# Patient Record
Sex: MALE | Race: WHITE | Employment: FULL TIME | URBAN - METROPOLITAN AREA
[De-identification: names, ages, dates, MRNs, and addresses within clinical notes are randomized per-mention and may not be internally consistent; named-entity substitution may affect disease eponyms.]

---

## 2018-08-10 ENCOUNTER — LAB ENCOUNTER (OUTPATIENT)
Dept: LAB | Age: 38
End: 2018-08-10
Attending: FAMILY MEDICINE
Payer: COMMERCIAL

## 2018-08-10 ENCOUNTER — OFFICE VISIT (OUTPATIENT)
Dept: FAMILY MEDICINE CLINIC | Facility: CLINIC | Age: 38
End: 2018-08-10
Payer: COMMERCIAL

## 2018-08-10 VITALS
HEART RATE: 60 BPM | SYSTOLIC BLOOD PRESSURE: 122 MMHG | WEIGHT: 182 LBS | DIASTOLIC BLOOD PRESSURE: 82 MMHG | HEIGHT: 74.17 IN | BODY MASS INDEX: 23.36 KG/M2

## 2018-08-10 DIAGNOSIS — Z87.438 HISTORY OF PROSTATITIS: ICD-10-CM

## 2018-08-10 DIAGNOSIS — E55.9 VITAMIN D DEFICIENCY: ICD-10-CM

## 2018-08-10 DIAGNOSIS — Z00.00 LABORATORY EXAMINATION ORDERED AS PART OF A ROUTINE GENERAL MEDICAL EXAMINATION: ICD-10-CM

## 2018-08-10 DIAGNOSIS — Z00.00 WELL ADULT EXAM: Primary | ICD-10-CM

## 2018-08-10 DIAGNOSIS — E78.2 MIXED HYPERLIPIDEMIA: ICD-10-CM

## 2018-08-10 LAB
ALBUMIN SERPL-MCNC: 4.4 G/DL (ref 3.5–4.8)
ALBUMIN/GLOB SERPL: 1.2 {RATIO} (ref 1–2)
ALP LIVER SERPL-CCNC: 63 U/L (ref 45–117)
ALT SERPL-CCNC: 29 U/L (ref 17–63)
ANION GAP SERPL CALC-SCNC: 6 MMOL/L (ref 0–18)
AST SERPL-CCNC: 13 U/L (ref 15–41)
BASOPHILS # BLD AUTO: 0.06 X10(3) UL (ref 0–0.1)
BASOPHILS NFR BLD AUTO: 0.9 %
BILIRUB SERPL-MCNC: 0.8 MG/DL (ref 0.1–2)
BUN BLD-MCNC: 16 MG/DL (ref 8–20)
BUN/CREAT SERPL: 11.4 (ref 10–20)
CALCIUM BLD-MCNC: 9.5 MG/DL (ref 8.3–10.3)
CHLORIDE SERPL-SCNC: 105 MMOL/L (ref 101–111)
CHOLEST SMN-MCNC: 228 MG/DL (ref ?–200)
CO2 SERPL-SCNC: 28 MMOL/L (ref 22–32)
COMPLEXED PSA SERPL-MCNC: 0.38 NG/ML (ref 0.01–4)
CREAT BLD-MCNC: 1.4 MG/DL (ref 0.7–1.3)
EOSINOPHIL # BLD AUTO: 0.2 X10(3) UL (ref 0–0.3)
EOSINOPHIL NFR BLD AUTO: 3 %
ERYTHROCYTE [DISTWIDTH] IN BLOOD BY AUTOMATED COUNT: 12.2 % (ref 11.5–16)
GLOBULIN PLAS-MCNC: 3.7 G/DL (ref 2.5–3.7)
GLUCOSE BLD-MCNC: 77 MG/DL (ref 70–99)
HCT VFR BLD AUTO: 50.1 % (ref 37–53)
HDLC SERPL-MCNC: 40 MG/DL (ref 40–59)
HGB BLD-MCNC: 16.1 G/DL (ref 13–17)
IMMATURE GRANULOCYTE COUNT: 0.02 X10(3) UL (ref 0–1)
IMMATURE GRANULOCYTE RATIO %: 0.3 %
LDLC SERPL CALC-MCNC: 159 MG/DL (ref ?–100)
LYMPHOCYTES # BLD AUTO: 2.48 X10(3) UL (ref 0.9–4)
LYMPHOCYTES NFR BLD AUTO: 36.6 %
M PROTEIN MFR SERPL ELPH: 8.1 G/DL (ref 6.1–8.3)
MCH RBC QN AUTO: 28.8 PG (ref 27–33.2)
MCHC RBC AUTO-ENTMCNC: 32.1 G/DL (ref 31–37)
MCV RBC AUTO: 89.6 FL (ref 80–99)
MONOCYTES # BLD AUTO: 0.57 X10(3) UL (ref 0.1–1)
MONOCYTES NFR BLD AUTO: 8.4 %
NEUTROPHIL ABS PRELIM: 3.44 X10 (3) UL (ref 1.3–6.7)
NEUTROPHILS # BLD AUTO: 3.44 X10(3) UL (ref 1.3–6.7)
NEUTROPHILS NFR BLD AUTO: 50.8 %
NONHDLC SERPL-MCNC: 188 MG/DL (ref ?–130)
OSMOLALITY SERPL CALC.SUM OF ELEC: 288 MOSM/KG (ref 275–295)
PLATELET # BLD AUTO: 221 10(3)UL (ref 150–450)
POTASSIUM SERPL-SCNC: 4.3 MMOL/L (ref 3.6–5.1)
RBC # BLD AUTO: 5.59 X10(6)UL (ref 4.3–5.7)
RED CELL DISTRIBUTION WIDTH-SD: 40.2 FL (ref 35.1–46.3)
SODIUM SERPL-SCNC: 139 MMOL/L (ref 136–144)
T4 FREE SERPL-MCNC: 0.9 NG/DL (ref 0.9–1.8)
TRIGL SERPL-MCNC: 144 MG/DL (ref 30–149)
TSI SER-ACNC: 1.81 MIU/ML (ref 0.35–5.5)
VIT D+METAB SERPL-MCNC: 21.2 NG/ML (ref 30–100)
VLDLC SERPL CALC-MCNC: 29 MG/DL (ref 0–30)
WBC # BLD AUTO: 6.8 X10(3) UL (ref 4–13)

## 2018-08-10 PROCEDURE — 80061 LIPID PANEL: CPT | Performed by: FAMILY MEDICINE

## 2018-08-10 PROCEDURE — 84439 ASSAY OF FREE THYROXINE: CPT | Performed by: FAMILY MEDICINE

## 2018-08-10 PROCEDURE — 84153 ASSAY OF PSA TOTAL: CPT | Performed by: FAMILY MEDICINE

## 2018-08-10 PROCEDURE — 99396 PREV VISIT EST AGE 40-64: CPT | Performed by: FAMILY MEDICINE

## 2018-08-10 PROCEDURE — 36415 COLL VENOUS BLD VENIPUNCTURE: CPT | Performed by: FAMILY MEDICINE

## 2018-08-10 PROCEDURE — 80050 GENERAL HEALTH PANEL: CPT | Performed by: FAMILY MEDICINE

## 2018-08-10 PROCEDURE — 82306 VITAMIN D 25 HYDROXY: CPT | Performed by: FAMILY MEDICINE

## 2018-08-10 RX ORDER — CHLORAL HYDRATE 500 MG
1000 CAPSULE ORAL WEEKLY
COMMUNITY
End: 2020-06-12

## 2018-08-10 NOTE — PROGRESS NOTES
Isela Sanders is a 34year old male who presents for a complete physical exam.   HPI:   Patient is here for complete physical past year has been uneventful except for his son having 2 episodes of MRSA. He has not had any issues.   Does have a small bi Yellow   Multistix Lot# [id number] Numeric   Multistix Expiration Date 11/2,017 Date         Current Outpatient Prescriptions:  omega-3 fatty acids 1000 MG Oral Cap Take 1,000 mg by mouth every other day.  Disp:  Rfl:    mesalamine 1000 MG Rectal Suppos Place respiratory symptoms  LUNGS: denies JAMES, wheezing, cough, orthopnea and PND  CARDIOVASCULAR: denies CP, palpitations, rapid or slow heart rate.  Denies GUZMAN/lower extremity swelling  GI: denies abdominal pain,denies heartburn, denies n/v/c/d/change in stools normal strength 5/5 and symmetric and with normal AROM/PROM. EXTREMITIES: no cyanosis, clubbing or edema  NEURO: A&O x3, CN II-XII grossly intact. Reflexes: biceps and patellar 2+ b/l and symmetric.  Gait is normal.  PSYCH: normal affect, no apparent thou PANEL; Future  - PSA SCREEN; Future  - TSH+FREE T4; Future  - VITAMIN D, 25-HYDROXY; Future       The patient verbalizes understanding of these recommendations and agrees to the plan.   The patient is asked to return for CPX in 1 year sooner if abnormal lab

## 2018-08-11 PROBLEM — Z87.438 HISTORY OF PROSTATITIS: Status: ACTIVE | Noted: 2018-08-11

## 2018-08-11 PROBLEM — E55.9 VITAMIN D DEFICIENCY: Status: ACTIVE | Noted: 2018-08-11

## 2018-08-11 NOTE — PROGRESS NOTES
Prostate, thyroid and complete blood count are normal  Lab results showed low Vitamin D. Advise RX vitamin D 50,000 IU once weekly for 12 weeks. Recheck vitamin D level in 3 months. After labs we will direct you on the dose of vitamin D needed OTC.   Crea

## 2018-08-14 ENCOUNTER — TELEPHONE (OUTPATIENT)
Dept: FAMILY MEDICINE CLINIC | Facility: CLINIC | Age: 38
End: 2018-08-14

## 2018-08-14 DIAGNOSIS — E55.9 VITAMIN D DEFICIENCY: Primary | ICD-10-CM

## 2018-08-14 DIAGNOSIS — R79.89 ELEVATED SERUM CREATININE: ICD-10-CM

## 2018-08-14 RX ORDER — ERGOCALCIFEROL 1.25 MG/1
50000 CAPSULE ORAL WEEKLY
Qty: 12 CAPSULE | Refills: 0 | Status: SHIPPED | OUTPATIENT
Start: 2018-08-14 | End: 2018-10-31

## 2018-08-14 NOTE — TELEPHONE ENCOUNTER
An order was placed for Vitamin D to be done in 3 months and prescription for Vitamin D was sent to the patient's pharmacy. An order was also placed for a BMP and urine microalbumin to be done in 1 month.

## 2018-08-14 NOTE — TELEPHONE ENCOUNTER
----- Message from Danish Chung PA-C sent at 8/11/2018  7:45 AM CDT -----  Prostate, thyroid and complete blood count are normal  Lab results showed low Vitamin D. Advise RX vitamin D 50,000 IU once weekly for 12 weeks.  Recheck vitamin D level in 3 m

## 2018-09-28 ENCOUNTER — LAB ENCOUNTER (OUTPATIENT)
Dept: LAB | Age: 38
End: 2018-09-28
Attending: FAMILY MEDICINE
Payer: COMMERCIAL

## 2018-09-28 ENCOUNTER — OFFICE VISIT (OUTPATIENT)
Dept: FAMILY MEDICINE CLINIC | Facility: CLINIC | Age: 38
End: 2018-09-28
Payer: COMMERCIAL

## 2018-09-28 VITALS
DIASTOLIC BLOOD PRESSURE: 80 MMHG | BODY MASS INDEX: 23.74 KG/M2 | WEIGHT: 185 LBS | SYSTOLIC BLOOD PRESSURE: 122 MMHG | TEMPERATURE: 98 F | HEART RATE: 64 BPM | HEIGHT: 74 IN

## 2018-09-28 DIAGNOSIS — Z23 NEED FOR VACCINATION: ICD-10-CM

## 2018-09-28 DIAGNOSIS — R79.89 ELEVATED SERUM CREATININE: ICD-10-CM

## 2018-09-28 DIAGNOSIS — K51.20 ULCERATIVE PROCTITIS WITHOUT COMPLICATION (HCC): ICD-10-CM

## 2018-09-28 DIAGNOSIS — Z79.899 MEDICATION MANAGEMENT: ICD-10-CM

## 2018-09-28 DIAGNOSIS — E78.2 MIXED HYPERLIPIDEMIA: Primary | ICD-10-CM

## 2018-09-28 LAB
ANION GAP SERPL CALC-SCNC: 6 MMOL/L (ref 0–18)
BUN BLD-MCNC: 16 MG/DL (ref 8–20)
BUN/CREAT SERPL: 14 (ref 10–20)
CALCIUM BLD-MCNC: 9.7 MG/DL (ref 8.3–10.3)
CHLORIDE SERPL-SCNC: 104 MMOL/L (ref 101–111)
CO2 SERPL-SCNC: 29 MMOL/L (ref 22–32)
CREAT BLD-MCNC: 1.14 MG/DL (ref 0.7–1.3)
CREAT UR-SCNC: 47.9 MG/DL
GLUCOSE BLD-MCNC: 67 MG/DL (ref 70–99)
MICROALBUMIN UR-MCNC: <0.5 MG/DL
OSMOLALITY SERPL CALC.SUM OF ELEC: 287 MOSM/KG (ref 275–295)
POTASSIUM SERPL-SCNC: 3.9 MMOL/L (ref 3.6–5.1)
SODIUM SERPL-SCNC: 139 MMOL/L (ref 136–144)

## 2018-09-28 PROCEDURE — 36415 COLL VENOUS BLD VENIPUNCTURE: CPT | Performed by: FAMILY MEDICINE

## 2018-09-28 PROCEDURE — 90686 IIV4 VACC NO PRSV 0.5 ML IM: CPT | Performed by: FAMILY MEDICINE

## 2018-09-28 PROCEDURE — 99214 OFFICE O/P EST MOD 30 MIN: CPT | Performed by: FAMILY MEDICINE

## 2018-09-28 PROCEDURE — 80048 BASIC METABOLIC PNL TOTAL CA: CPT | Performed by: FAMILY MEDICINE

## 2018-09-28 PROCEDURE — 82043 UR ALBUMIN QUANTITATIVE: CPT | Performed by: FAMILY MEDICINE

## 2018-09-28 PROCEDURE — 90471 IMMUNIZATION ADMIN: CPT | Performed by: FAMILY MEDICINE

## 2018-09-28 PROCEDURE — 82570 ASSAY OF URINE CREATININE: CPT | Performed by: FAMILY MEDICINE

## 2018-09-28 RX ORDER — ROSUVASTATIN CALCIUM 5 MG/1
5 TABLET, COATED ORAL NIGHTLY
Qty: 30 TABLET | Refills: 5 | Status: SHIPPED | OUTPATIENT
Start: 2018-09-28 | End: 2019-02-14 | Stop reason: SINTOL

## 2018-09-28 NOTE — PROGRESS NOTES
Isi Manzanares is a 34year old male. HPI:   #1 follow up labs renal function  Elevated creatinine 1.4  decreased GFR 62.   Has been avoiding nonsteroidals though does take medication for proctitis  1-2 Beers 4 times a week has cut back on drinking  N TSH+FREE T4   Result Value Ref Range    Free T4 0.9 0.9 - 1.8 ng/dL    TSH 1.810 0.350 - 5.500 mIU/mL   VITAMIN D, 25-HYDROXY   Result Value Ref Range    25-Hydroxyvitamin D (Total) 21.2 (L) 30.0 - 100.0 ng/mL   CBC W/ DIFFERENTIAL   Result Value Ref Ran other eczema, due to unspecified cause    • Mumps without mention of complication    • Other and unspecified superficial injury of finger without mention of infection    • Proctitis    • Sleep disturbance, unspecified    • Sprain of neck    • Unspecified h serum creatinine  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

## 2018-09-30 PROBLEM — Z87.19 HISTORY OF COLITIS: Status: RESOLVED | Noted: 2018-09-30 | Resolved: 2018-09-30

## 2018-09-30 PROBLEM — Z87.19 HISTORY OF COLITIS: Status: ACTIVE | Noted: 2018-09-30

## 2018-09-30 PROBLEM — Z87.438 HISTORY OF PROSTATITIS: Status: RESOLVED | Noted: 2018-08-11 | Resolved: 2018-09-30

## 2018-09-30 PROBLEM — K51.20 ULCERATIVE PROCTITIS WITHOUT COMPLICATION (HCC): Status: ACTIVE | Noted: 2018-09-30

## 2018-09-30 NOTE — PROGRESS NOTES
Glomerular filtration rate i.e. kidney function is back to normal.  No protein in urine. Sent to my chart.

## 2019-01-18 ENCOUNTER — HOSPITAL ENCOUNTER (OUTPATIENT)
Dept: CT IMAGING | Facility: HOSPITAL | Age: 39
Discharge: HOME | End: 2019-01-18
Attending: FAMILY MEDICINE

## 2019-01-18 DIAGNOSIS — Z13.6 SCREENING FOR CARDIOVASCULAR CONDITION: ICD-10-CM

## 2019-02-04 ENCOUNTER — LAB ENCOUNTER (OUTPATIENT)
Dept: LAB | Age: 39
End: 2019-02-04
Attending: FAMILY MEDICINE
Payer: COMMERCIAL

## 2019-02-04 ENCOUNTER — OFFICE VISIT (OUTPATIENT)
Dept: FAMILY MEDICINE CLINIC | Facility: CLINIC | Age: 39
End: 2019-02-04
Payer: COMMERCIAL

## 2019-02-04 VITALS
BODY MASS INDEX: 24.26 KG/M2 | WEIGHT: 189 LBS | SYSTOLIC BLOOD PRESSURE: 110 MMHG | HEART RATE: 68 BPM | DIASTOLIC BLOOD PRESSURE: 70 MMHG | HEIGHT: 74 IN

## 2019-02-04 DIAGNOSIS — R06.02 SHORTNESS OF BREATH: ICD-10-CM

## 2019-02-04 DIAGNOSIS — E55.9 VITAMIN D DEFICIENCY: ICD-10-CM

## 2019-02-04 DIAGNOSIS — Z79.899 MEDICATION MANAGEMENT: ICD-10-CM

## 2019-02-04 DIAGNOSIS — M79.10 MYALGIA: ICD-10-CM

## 2019-02-04 DIAGNOSIS — R09.81 NASAL CONGESTION: ICD-10-CM

## 2019-02-04 DIAGNOSIS — S16.1XXA STRAIN OF CERVICAL PORTION OF LEFT TRAPEZIUS MUSCLE: Primary | ICD-10-CM

## 2019-02-04 DIAGNOSIS — M54.2 NECK PAIN ON LEFT SIDE: ICD-10-CM

## 2019-02-04 DIAGNOSIS — E78.2 MIXED HYPERLIPIDEMIA: ICD-10-CM

## 2019-02-04 DIAGNOSIS — F43.0 PANIC ATTACK AS REACTION TO STRESS: ICD-10-CM

## 2019-02-04 DIAGNOSIS — F41.0 PANIC ATTACK AS REACTION TO STRESS: ICD-10-CM

## 2019-02-04 LAB
ALBUMIN SERPL-MCNC: 4.3 G/DL (ref 3.1–4.5)
ALBUMIN/GLOB SERPL: 1.3 {RATIO} (ref 1–2)
ALP LIVER SERPL-CCNC: 66 U/L (ref 45–117)
ALT SERPL-CCNC: 37 U/L (ref 17–63)
ANION GAP SERPL CALC-SCNC: 5 MMOL/L (ref 0–18)
AST SERPL-CCNC: 16 U/L (ref 15–41)
BILIRUB SERPL-MCNC: 0.6 MG/DL (ref 0.1–2)
BUN BLD-MCNC: 17 MG/DL (ref 8–20)
BUN/CREAT SERPL: 13.7 (ref 10–20)
CALCIUM BLD-MCNC: 9 MG/DL (ref 8.3–10.3)
CHLORIDE SERPL-SCNC: 105 MMOL/L (ref 101–111)
CHOLEST SMN-MCNC: 160 MG/DL (ref ?–200)
CK SERPL-CCNC: 69 IU/L (ref 39–308)
CO2 SERPL-SCNC: 28 MMOL/L (ref 22–32)
CREAT BLD-MCNC: 1.24 MG/DL (ref 0.7–1.3)
D-DIMER: <0.27 UG/ML FEU (ref 0–0.49)
GLOBULIN PLAS-MCNC: 3.4 G/DL (ref 2.8–4.4)
GLUCOSE BLD-MCNC: 85 MG/DL (ref 70–99)
HDLC SERPL-MCNC: 42 MG/DL (ref 40–59)
LDLC SERPL CALC-MCNC: 94 MG/DL (ref ?–100)
M PROTEIN MFR SERPL ELPH: 7.7 G/DL (ref 6.4–8.2)
NONHDLC SERPL-MCNC: 118 MG/DL (ref ?–130)
OSMOLALITY SERPL CALC.SUM OF ELEC: 287 MOSM/KG (ref 275–295)
POTASSIUM SERPL-SCNC: 4.3 MMOL/L (ref 3.6–5.1)
SODIUM SERPL-SCNC: 138 MMOL/L (ref 136–144)
TRIGL SERPL-MCNC: 122 MG/DL (ref 30–149)
VIT D+METAB SERPL-MCNC: 44.6 NG/ML (ref 30–100)
VLDLC SERPL CALC-MCNC: 24 MG/DL (ref 0–30)

## 2019-02-04 PROCEDURE — 85378 FIBRIN DEGRADE SEMIQUANT: CPT | Performed by: FAMILY MEDICINE

## 2019-02-04 PROCEDURE — 80061 LIPID PANEL: CPT | Performed by: FAMILY MEDICINE

## 2019-02-04 PROCEDURE — 94010 BREATHING CAPACITY TEST: CPT | Performed by: FAMILY MEDICINE

## 2019-02-04 PROCEDURE — 99214 OFFICE O/P EST MOD 30 MIN: CPT | Performed by: FAMILY MEDICINE

## 2019-02-04 PROCEDURE — 36415 COLL VENOUS BLD VENIPUNCTURE: CPT | Performed by: FAMILY MEDICINE

## 2019-02-04 PROCEDURE — 80053 COMPREHEN METABOLIC PANEL: CPT | Performed by: FAMILY MEDICINE

## 2019-02-04 PROCEDURE — 82306 VITAMIN D 25 HYDROXY: CPT | Performed by: FAMILY MEDICINE

## 2019-02-04 PROCEDURE — 82550 ASSAY OF CK (CPK): CPT | Performed by: FAMILY MEDICINE

## 2019-02-04 RX ORDER — MESALAMINE 1000 MG/1
1000 SUPPOSITORY RECTAL NIGHTLY PRN
Qty: 30 SUPPOSITORY | Refills: 6 | COMMUNITY
Start: 2019-02-04

## 2019-02-04 RX ORDER — CYCLOBENZAPRINE HCL 5 MG
TABLET ORAL NIGHTLY
Qty: 20 TABLET | Refills: 0 | Status: SHIPPED | OUTPATIENT
Start: 2019-02-04 | End: 2019-05-31 | Stop reason: ALTCHOICE

## 2019-02-04 RX ORDER — ALPRAZOLAM 0.25 MG/1
TABLET ORAL 2 TIMES DAILY PRN
Qty: 10 TABLET | Refills: 0 | Status: SHIPPED | OUTPATIENT
Start: 2019-02-04 | End: 2019-05-31

## 2019-02-04 NOTE — PROGRESS NOTES
Cristóbal Ewing is a 34year old male. HPI:   Patient presents with:  Neck Pain: Rm. 10  Back Pain  Anxiety    #1 shortness of breath/anxiety  Nasal and chest tightness no congestion or signs of respiratory infection or asthma. .  On and off since [place] checked off any problems, how difficult have these problems made it for you to do your work, take care of things at home, or get along with other people?  Not difficult at all         #2 neck pan left   History of chronic neck pain has had epidurals done in [place] without mention of complication       Social History:  Social History    Tobacco Use      Smoking status: Never Smoker      Smokeless tobacco: Former User        Types: Chew    Alcohol use:  Yes      Alcohol/week: 2.4 - 4.2 oz      Types: 4 - 7 St (primary encounter diagnosis)  Neck pain on left side  Myalgia  Shortness of breath  Nasal congestion  Panic attack as reaction to stress    Orders Placed This Encounter      CK (Creatine Kinase) (Not Creatinine) (E)      D-Dimer [E]      Spriometry Levon Mares alprazolam discussed. Including not to drive, operate machinery or drink alcohol when taking this medication.    - ALPRAZolam (XANAX) 0.25 MG Oral Tab;  Take 1-2 tablets (0.25-0.5 mg total) by mouth 2 (two) times daily as needed for Anxiety (Use during karolyn

## 2019-02-04 NOTE — PROGRESS NOTES
Lipids are now normal with medication. As discussed in office visit hold on taking medication to see if muscle aches improve if not can return to taking medication the liver function tests are good.   Vitamin D, CMP, lipid and creatinine kinase are normal

## 2019-02-14 DIAGNOSIS — F41.1 GAD (GENERALIZED ANXIETY DISORDER): ICD-10-CM

## 2019-02-14 PROBLEM — T46.6X5A ADVERSE REACTION TO STATIN MEDICATION: Status: ACTIVE | Noted: 2019-02-14

## 2019-02-14 PROBLEM — R06.02 SHORTNESS OF BREATH: Status: RESOLVED | Noted: 2019-02-04 | Resolved: 2019-02-14

## 2019-02-15 RX ORDER — ESCITALOPRAM OXALATE 5 MG/1
TABLET ORAL
Qty: 90 TABLET | Refills: 1 | OUTPATIENT
Start: 2019-02-15

## 2019-04-14 DIAGNOSIS — F41.1 GAD (GENERALIZED ANXIETY DISORDER): ICD-10-CM

## 2019-04-15 RX ORDER — ESCITALOPRAM OXALATE 5 MG/1
TABLET ORAL
Qty: 90 TABLET | Refills: 0 | Status: SHIPPED | OUTPATIENT
Start: 2019-04-15 | End: 2019-05-31

## 2019-05-31 ENCOUNTER — OFFICE VISIT (OUTPATIENT)
Dept: FAMILY MEDICINE CLINIC | Facility: CLINIC | Age: 39
End: 2019-05-31
Payer: COMMERCIAL

## 2019-05-31 VITALS
WEIGHT: 183 LBS | HEIGHT: 74 IN | SYSTOLIC BLOOD PRESSURE: 120 MMHG | DIASTOLIC BLOOD PRESSURE: 74 MMHG | HEART RATE: 68 BPM | BODY MASS INDEX: 23.49 KG/M2

## 2019-05-31 DIAGNOSIS — E78.2 MIXED HYPERLIPIDEMIA: Primary | ICD-10-CM

## 2019-05-31 DIAGNOSIS — F41.1 GAD (GENERALIZED ANXIETY DISORDER): ICD-10-CM

## 2019-05-31 DIAGNOSIS — Z79.899 MEDICATION MANAGEMENT: ICD-10-CM

## 2019-05-31 PROCEDURE — 99214 OFFICE O/P EST MOD 30 MIN: CPT | Performed by: FAMILY MEDICINE

## 2019-05-31 RX ORDER — ESCITALOPRAM OXALATE 10 MG/1
10 TABLET ORAL DAILY
Qty: 30 TABLET | Refills: 1 | Status: SHIPPED | OUTPATIENT
Start: 2019-05-31 | End: 2019-07-27

## 2019-05-31 RX ORDER — ESCITALOPRAM OXALATE 10 MG/1
TABLET ORAL
Qty: 90 TABLET | Refills: 1 | OUTPATIENT
Start: 2019-05-31

## 2019-05-31 NOTE — PROGRESS NOTES
Hilda Hedrick is a 37year old male. HPI:   #1 follow-up generalized anxiety disorder  Patient has been on Lexapro 5 mg has not noticed a significant change though has not had any significant panic attacks.   Does state he is more irritated and anxio #2 hyperlipidemia  Patient been on Crestor had some back pain had held off of using it and back pain resolved after 1 month. Has high cholesterol without any cardiac history. Ultrafast CT of the heart done on 1/25/2019 0 calcifications.   Denies an headaches  Musculoskeletal: No motor deficits  Psych see above  EXAM:   /74 (BP Location: Left arm, Patient Position: Sitting, Cuff Size: adult)   Pulse 68   Ht 74\"   Wt 183 lb   BMI 23.50 kg/m²   GENERAL: well developed, well nourished,in no appare mouth daily. Dispense: 30 tablet; Refill: 1    3. Medication management  As above    The patient indicates understanding of these issues and agrees to the plan. The patient is asked to return in as needed in 2 months.

## 2019-06-17 NOTE — PATIENT INSTRUCTIONS
SCHEDULING [facility] APPOINTMENTS ONLINE    Lab appointments can now be scheduled online at [email or web address]    · Go to [email or web address]  · In Search type Lab  · Click \"Lab services\"  · Click \"Schedule Your Test Online\"  · Follow the prompts  · If you Yes

## 2019-06-28 ENCOUNTER — OFFICE VISIT (OUTPATIENT)
Dept: FAMILY MEDICINE CLINIC | Facility: CLINIC | Age: 39
End: 2019-06-28
Payer: COMMERCIAL

## 2019-06-28 VITALS
TEMPERATURE: 98 F | DIASTOLIC BLOOD PRESSURE: 70 MMHG | WEIGHT: 182 LBS | HEIGHT: 74 IN | OXYGEN SATURATION: 98 % | SYSTOLIC BLOOD PRESSURE: 102 MMHG | HEART RATE: 76 BPM | BODY MASS INDEX: 23.36 KG/M2

## 2019-06-28 DIAGNOSIS — J06.9 ACUTE URI: Primary | ICD-10-CM

## 2019-06-28 PROCEDURE — 99213 OFFICE O/P EST LOW 20 MIN: CPT | Performed by: FAMILY MEDICINE

## 2019-06-28 RX ORDER — AMOXICILLIN AND CLAVULANATE POTASSIUM 875; 125 MG/1; MG/1
1 TABLET, FILM COATED ORAL 2 TIMES DAILY
Qty: 20 TABLET | Refills: 0 | Status: SHIPPED | OUTPATIENT
Start: 2019-06-28 | End: 2019-07-08

## 2019-06-28 NOTE — PROGRESS NOTES
HPI:   Guerda Velásquez is a 37year old male who presents for upper respiratory symptoms for  4  days. Patient reports congestion, dry cough, cough is keeping pt up at night, denies sinus pain.  No ST had sinus pressure used no fever, chills and body ac History    Tobacco Use      Smoking status: Never Smoker      Smokeless tobacco: Former User        Types: Chew    Alcohol use:  Yes      Alcohol/week: 2.4 - 4.2 oz      Types: 4 - 7 Standard drinks or equivalent per week      Frequency: 4 or more times a w URI  Guaifenesin generic (Mucinex) for expectorant   Darlene pot-- saline irrigation or saline spray  Vitamin C , Zinc and Echinacea   OTC Flonase for nasal congestion and sinus pressure  Warm steams increase humidity.     Do not start antibiotic unless the s

## 2019-06-29 PROBLEM — M54.2 NECK PAIN ON LEFT SIDE: Status: RESOLVED | Noted: 2019-02-04 | Resolved: 2019-06-29

## 2019-07-02 ENCOUNTER — PATIENT MESSAGE (OUTPATIENT)
Dept: FAMILY MEDICINE CLINIC | Facility: CLINIC | Age: 39
End: 2019-07-02

## 2019-07-03 NOTE — TELEPHONE ENCOUNTER
From: Rosamaria Hines  To:  Zari Acosta PA-C  Sent: 7/2/2019 1:37 PM CDT  Subject: Visit Follow-up Question    Ayad Dust been coughing up some stuff from chest. I don't know if I told you that last week, but, i think I only started coughi

## 2019-07-27 DIAGNOSIS — F41.1 GAD (GENERALIZED ANXIETY DISORDER): ICD-10-CM

## 2019-07-29 RX ORDER — ESCITALOPRAM OXALATE 10 MG/1
TABLET ORAL
Qty: 30 TABLET | Refills: 0 | Status: SHIPPED | OUTPATIENT
Start: 2019-07-29 | End: 2019-09-06

## 2019-07-29 NOTE — TELEPHONE ENCOUNTER
Pt requesting refill of ESCITALOPRAM    Last Time Medication was Filled:  5/31/19 qty. 30 +1    Last Office Visit with PCP: 6/28/19      No future appointments.

## 2019-09-06 DIAGNOSIS — F41.1 GAD (GENERALIZED ANXIETY DISORDER): ICD-10-CM

## 2019-09-06 RX ORDER — ESCITALOPRAM OXALATE 10 MG/1
TABLET ORAL
Qty: 30 TABLET | Refills: 0 | Status: SHIPPED | OUTPATIENT
Start: 2019-09-06 | End: 2019-09-20

## 2019-09-06 NOTE — TELEPHONE ENCOUNTER
Patient phoned back. He stated he will call next week with appointment with Diana Mckenna.   30 day refill sent for escitalopram.

## 2019-09-20 ENCOUNTER — LAB ENCOUNTER (OUTPATIENT)
Dept: LAB | Age: 39
End: 2019-09-20
Attending: FAMILY MEDICINE
Payer: COMMERCIAL

## 2019-09-20 DIAGNOSIS — E78.2 MIXED HYPERLIPIDEMIA: ICD-10-CM

## 2019-09-20 LAB
CHOLEST SMN-MCNC: 203 MG/DL (ref ?–200)
HDLC SERPL-MCNC: 41 MG/DL (ref 40–59)
LDLC SERPL CALC-MCNC: 135 MG/DL (ref ?–100)
NONHDLC SERPL-MCNC: 162 MG/DL (ref ?–130)
TRIGL SERPL-MCNC: 136 MG/DL (ref 30–149)
VLDLC SERPL CALC-MCNC: 27 MG/DL (ref 0–30)

## 2019-09-20 PROCEDURE — 80061 LIPID PANEL: CPT | Performed by: FAMILY MEDICINE

## 2019-09-20 PROCEDURE — 36415 COLL VENOUS BLD VENIPUNCTURE: CPT | Performed by: FAMILY MEDICINE

## 2019-09-20 NOTE — PROGRESS NOTES
Isela Sanders is a 37year old male. HPI:   #1 Skin nodules  Patient is a scabbing on his forehead left side secondary to a reoccurring papule that he experiences in various locations on face and neck.   States it does not itch when the papule occurs doing it out of boredom but realized with the family history of alcoholism that he cannot engage in any more than 1 drink per night. Family history paternal grandfather and 4 paternal uncles with alcoholism.   PHQ9 FLOWSHEET 9/20/2019   Little interest or superficial injury of finger without mention of infection    • Proctitis    • Sleep disturbance, unspecified    • Sprain of neck    • Unspecified hemorrhoids with other complication 96/67/77   • Unspecified hemorrhoids without mention of complication 04/03 types were placed in this encounter. Meds & Refills for this Visit:  Requested Prescriptions     Signed Prescriptions Disp Refills   • Mupirocin Calcium 2 % External Cream 30 g 0     Sig: Apply 1 Application topically 2 (two) times daily.    • escitalo counseling regarding medical conditions and treatment. The patient indicates understanding of these issues and agrees to the plan. The patient is asked to return in 6 weeks.

## 2019-09-21 NOTE — PROGRESS NOTES
Cholesterol is improved from 1 year ago still slightly elevated if you want to try a low dose of pravastatin 10 mg we can send that over. If you experience body aches again we will discontinue. Send me a message if you want to try pravastatin.   Sent to brianna

## 2019-11-08 NOTE — PROGRESS NOTES
Vijaya Barroso is a 37year old male. HPI:   Patient is in for follow-up on generalized anxiety disorder last month was increased to 15 mg states that he did improve with the medication and has started counseling every other week.   Overall is getting injury of finger without mention of infection    • Proctitis    • Sleep disturbance, unspecified    • Sprain of neck    • Unspecified hemorrhoids with other complication 18/34/14   • Unspecified hemorrhoids without mention of complication 63/08[birth year]47   • Magdalena Potts placed in this encounter. Meds & Refills for this Visit:  Requested Prescriptions     Signed Prescriptions Disp Refills   • escitalopram (LEXAPRO) 20 MG Oral Tab 90 tablet 0     Sig: Take 1 tablet (20 mg total) by mouth daily.        Imaging & Consults

## 2020-01-12 DIAGNOSIS — F41.1 GAD (GENERALIZED ANXIETY DISORDER): ICD-10-CM

## 2020-01-13 RX ORDER — ESCITALOPRAM OXALATE 10 MG/1
TABLET ORAL
Qty: 135 TABLET | Refills: 0 | OUTPATIENT
Start: 2020-01-13

## 2020-02-07 RX ORDER — ESCITALOPRAM OXALATE 20 MG/1
TABLET ORAL
Qty: 90 TABLET | Refills: 0 | OUTPATIENT
Start: 2020-02-07

## 2020-02-07 NOTE — TELEPHONE ENCOUNTER
Refill request for ESCITALOPRAM 20MG. Last fill 11/8/19. Last OV 11/8/19. Per notes: The patient indicates understanding of these issues and agrees to the plan. The patient is asked to return in 6 weeks.       No future appointment.   Refill denied a

## 2020-03-17 RX ORDER — ESCITALOPRAM OXALATE 20 MG/1
20 TABLET ORAL DAILY
Qty: 90 TABLET | Refills: 0 | Status: SHIPPED | OUTPATIENT
Start: 2020-03-17 | End: 2020-06-12

## 2020-06-12 ENCOUNTER — OFFICE VISIT (OUTPATIENT)
Dept: FAMILY MEDICINE CLINIC | Facility: CLINIC | Age: 40
End: 2020-06-12
Payer: COMMERCIAL

## 2020-06-12 VITALS
HEIGHT: 74.21 IN | TEMPERATURE: 99 F | DIASTOLIC BLOOD PRESSURE: 78 MMHG | WEIGHT: 200 LBS | BODY MASS INDEX: 25.67 KG/M2 | SYSTOLIC BLOOD PRESSURE: 106 MMHG | HEART RATE: 64 BPM

## 2020-06-12 DIAGNOSIS — Z00.00 WELL ADULT EXAM: Primary | ICD-10-CM

## 2020-06-12 DIAGNOSIS — E78.2 MIXED HYPERLIPIDEMIA: ICD-10-CM

## 2020-06-12 DIAGNOSIS — Z00.00 LABORATORY EXAMINATION ORDERED AS PART OF A ROUTINE GENERAL MEDICAL EXAMINATION: ICD-10-CM

## 2020-06-12 DIAGNOSIS — Z79.899 MEDICATION MANAGEMENT: ICD-10-CM

## 2020-06-12 DIAGNOSIS — K51.20 ULCERATIVE PROCTITIS WITHOUT COMPLICATION (HCC): ICD-10-CM

## 2020-06-12 DIAGNOSIS — F41.1 GAD (GENERALIZED ANXIETY DISORDER): ICD-10-CM

## 2020-06-12 PROCEDURE — 99213 OFFICE O/P EST LOW 20 MIN: CPT | Performed by: FAMILY MEDICINE

## 2020-06-12 PROCEDURE — 99396 PREV VISIT EST AGE 40-64: CPT | Performed by: FAMILY MEDICINE

## 2020-06-12 RX ORDER — CALCIUM CITRATE/VITAMIN D3 200MG-6.25
2 TABLET ORAL DAILY
COMMUNITY

## 2020-06-12 RX ORDER — ESCITALOPRAM OXALATE 10 MG/1
10 TABLET ORAL DAILY
Qty: 90 TABLET | Refills: 0 | Status: SHIPPED | OUTPATIENT
Start: 2020-06-12 | End: 2020-08-19

## 2020-06-12 RX ORDER — BUSPIRONE HYDROCHLORIDE 10 MG/1
TABLET ORAL 3 TIMES DAILY
Qty: 60 TABLET | Refills: 1 | Status: SHIPPED | OUTPATIENT
Start: 2020-06-12 | End: 2020-08-19

## 2020-06-12 NOTE — PATIENT INSTRUCTIONS
Routine Healthcare for Men   Routine checkups can find treatable problems early. For many medical problems, early treatment can help prevent more serious complications. The value of checkups and how often you have them depend mainly on your age.  Your perso controversial. Many studies have been done, but they do not yet show that it is practical to do it on all men at their checkups. The test often gives misleading results and can cause undue anxiety, expense, and unnecessary medical procedures.  You should di whooping cough (pertussis) as well as tetanus. If you are 72 or older, this new vaccine has not yet been approved for your age group.  Because babies are most susceptible to complications from whooping cough, Tdap is especially recommended for adults caring vegetables in your diet. Get regular physical activity or exercise. Injury prevention: Use lap and shoulder belts when you drive. Use a helmet when you ride a motorcycle or bicycle.  If you are around guns or other firearms, practice safe handling and marcellus

## 2020-06-12 NOTE — PROGRESS NOTES
Isi Manzanares is a 41year old male who presents for a complete physical exam.   HPI:   Patient is here for complete physical past year has been on Lexapro 20 mg tolerating well no side effects.    No anxiety right now since he is working from home do Medications   Medication Sig Dispense Refill   • Multiple Vitamins-Minerals (MULTIVITAMIN GUMMIES MENS) Oral Chew Tab Chew 2 tablets by mouth daily. • Cholecalciferol (VITAMIN D3 GUMMIES OR) Take 2 tablets by mouth daily.      • busPIRone HCl 10 MG Oral Alcohol/week: 4.0 - 7.0 standard drinks      Types: 4 - 7 Standard drinks or equivalent per week      Frequency: 4 or more times a week      Drinks per session: 1 or 2      Binge frequency: Less than monthly      Comment: moderate to daily    Drug use: No palpation  LUNGS: CTA A/P, no wheezes/ronchi/rales/crackles, normal air excursion  CARDIOVASCULAR: RRR, no murmur, no lower extremity edema, pedal and femoral pulses 2+ and symmetric b/l  GI: normoactive BS, non-distended, non-tender to palpation, no HSM/m 90 tablet 0     Sig: Take 1 tablet (10 mg total) by mouth daily. Imaging & Consults:  None  1. Well adult exam  Recommend healthy diet including green leafy vegetables, fresh fruits and lean meats.   Aerobic exercise 30 minutes five days a week for ca sooner if abnormal labs present and as needed. Also, for nurse visit in the Fall for influenza immunization.

## 2020-06-15 RX ORDER — ESCITALOPRAM OXALATE 20 MG/1
TABLET ORAL
Qty: 90 TABLET | Refills: 0 | OUTPATIENT
Start: 2020-06-15

## 2020-06-15 NOTE — TELEPHONE ENCOUNTER
Per Office Note on 06/12/2020 from Danish Chung PA-C, \"decrease Lexapro by 5 mg every week until dose that does not cause ED\" and a prescription for Lexapro 10 mg was sent to the pharmacy.

## 2020-08-19 PROBLEM — R39.198 DECREASED URINE STREAM: Status: ACTIVE | Noted: 2020-08-19

## 2020-08-20 NOTE — PROGRESS NOTES
Isela Sanders is a 41year old male. HPI:   Please note that the following visit was completed using two-way, real-time interactive audio and video communication.   This has been done in good yvette to provide continuity of care in the best interest o (LEXAPRO) 5 MG Oral Tab Take 1 tablet (5 mg total) by mouth daily. 30 tablet 0   • Multiple Vitamins-Minerals (MULTIVITAMIN GUMMIES MENS) Oral Chew Tab Chew 2 tablets by mouth daily.      • Cholecalciferol (VITAMIN D3 GUMMIES OR) Take 2 tablets by mouth leonarda COVID-19 pandemic. Reviewed medications, problem list and allergies. GENERAL: Patient is speaking in full sentences no increased work in breathing patient is alert and orientated x3.     Psych patient's mood is normal and communication skills are good

## 2020-09-02 ENCOUNTER — LAB ENCOUNTER (OUTPATIENT)
Dept: LAB | Age: 40
End: 2020-09-02
Attending: FAMILY MEDICINE
Payer: COMMERCIAL

## 2020-09-02 DIAGNOSIS — Z00.00 WELL ADULT EXAM: ICD-10-CM

## 2020-09-02 DIAGNOSIS — Z00.00 LABORATORY EXAMINATION ORDERED AS PART OF A ROUTINE GENERAL MEDICAL EXAMINATION: ICD-10-CM

## 2020-09-02 LAB
ALBUMIN SERPL-MCNC: 4 G/DL (ref 3.4–5)
ALBUMIN/GLOB SERPL: 1.1 {RATIO} (ref 1–2)
ALP LIVER SERPL-CCNC: 66 U/L (ref 45–117)
ALT SERPL-CCNC: 34 U/L (ref 16–61)
ANION GAP SERPL CALC-SCNC: 3 MMOL/L (ref 0–18)
AST SERPL-CCNC: 17 U/L (ref 15–37)
BASOPHILS # BLD AUTO: 0.07 X10(3) UL (ref 0–0.2)
BASOPHILS NFR BLD AUTO: 1.1 %
BILIRUB SERPL-MCNC: 0.5 MG/DL (ref 0.1–2)
BUN BLD-MCNC: 13 MG/DL (ref 7–18)
BUN/CREAT SERPL: 10.4 (ref 10–20)
CALCIUM BLD-MCNC: 8.7 MG/DL (ref 8.5–10.1)
CHLORIDE SERPL-SCNC: 104 MMOL/L (ref 98–112)
CHOLEST SMN-MCNC: 236 MG/DL (ref ?–200)
CO2 SERPL-SCNC: 31 MMOL/L (ref 21–32)
COMPLEXED PSA SERPL-MCNC: 0.36 NG/ML (ref ?–4)
CREAT BLD-MCNC: 1.25 MG/DL (ref 0.7–1.3)
DEPRECATED RDW RBC AUTO: 41.4 FL (ref 35.1–46.3)
EOSINOPHIL # BLD AUTO: 0.23 X10(3) UL (ref 0–0.7)
EOSINOPHIL NFR BLD AUTO: 3.7 %
ERYTHROCYTE [DISTWIDTH] IN BLOOD BY AUTOMATED COUNT: 12.4 % (ref 11–15)
GLOBULIN PLAS-MCNC: 3.5 G/DL (ref 2.8–4.4)
GLUCOSE BLD-MCNC: 85 MG/DL (ref 70–99)
HCT VFR BLD AUTO: 49.5 % (ref 39–53)
HDLC SERPL-MCNC: 38 MG/DL (ref 40–59)
HGB BLD-MCNC: 15.3 G/DL (ref 13–17.5)
IMM GRANULOCYTES # BLD AUTO: 0.02 X10(3) UL (ref 0–1)
IMM GRANULOCYTES NFR BLD: 0.3 %
LDLC SERPL CALC-MCNC: 132 MG/DL (ref ?–100)
LYMPHOCYTES # BLD AUTO: 2.86 X10(3) UL (ref 1–4)
LYMPHOCYTES NFR BLD AUTO: 45.9 %
M PROTEIN MFR SERPL ELPH: 7.5 G/DL (ref 6.4–8.2)
MCH RBC QN AUTO: 28.3 PG (ref 26–34)
MCHC RBC AUTO-ENTMCNC: 30.9 G/DL (ref 31–37)
MCV RBC AUTO: 91.7 FL (ref 80–100)
MONOCYTES # BLD AUTO: 0.48 X10(3) UL (ref 0.1–1)
MONOCYTES NFR BLD AUTO: 7.7 %
NEUTROPHILS # BLD AUTO: 2.57 X10 (3) UL (ref 1.5–7.7)
NEUTROPHILS # BLD AUTO: 2.57 X10(3) UL (ref 1.5–7.7)
NEUTROPHILS NFR BLD AUTO: 41.3 %
NONHDLC SERPL-MCNC: 198 MG/DL (ref ?–130)
OSMOLALITY SERPL CALC.SUM OF ELEC: 285 MOSM/KG (ref 275–295)
PATIENT FASTING Y/N/NP: YES
PATIENT FASTING Y/N/NP: YES
PLATELET # BLD AUTO: 204 10(3)UL (ref 150–450)
POTASSIUM SERPL-SCNC: 4.4 MMOL/L (ref 3.5–5.1)
RBC # BLD AUTO: 5.4 X10(6)UL (ref 4.3–5.7)
SODIUM SERPL-SCNC: 138 MMOL/L (ref 136–145)
T4 FREE SERPL-MCNC: 0.9 NG/DL (ref 0.8–1.7)
TRIGL SERPL-MCNC: 330 MG/DL (ref 30–149)
TSI SER-ACNC: 3.05 MIU/ML (ref 0.36–3.74)
VLDLC SERPL CALC-MCNC: 66 MG/DL (ref 0–30)
WBC # BLD AUTO: 6.2 X10(3) UL (ref 4–11)

## 2020-09-02 PROCEDURE — 85025 COMPLETE CBC W/AUTO DIFF WBC: CPT

## 2020-09-02 PROCEDURE — 36415 COLL VENOUS BLD VENIPUNCTURE: CPT

## 2020-09-02 PROCEDURE — 80053 COMPREHEN METABOLIC PANEL: CPT

## 2020-09-02 PROCEDURE — 84443 ASSAY THYROID STIM HORMONE: CPT

## 2020-09-02 PROCEDURE — 80061 LIPID PANEL: CPT

## 2020-09-02 PROCEDURE — 84439 ASSAY OF FREE THYROXINE: CPT

## 2020-09-03 NOTE — PROGRESS NOTES
Normal CMP, thyroid, complete blood count and prostate blood test.  Lipids are elevated make a virtual appointment to discuss abnormal lipid results.   Sent to my chart

## 2020-10-09 ENCOUNTER — OFFICE VISIT (OUTPATIENT)
Dept: FAMILY MEDICINE CLINIC | Facility: CLINIC | Age: 40
End: 2020-10-09
Payer: COMMERCIAL

## 2020-10-09 VITALS
SYSTOLIC BLOOD PRESSURE: 122 MMHG | HEIGHT: 74.21 IN | WEIGHT: 199 LBS | BODY MASS INDEX: 25.54 KG/M2 | HEART RATE: 84 BPM | DIASTOLIC BLOOD PRESSURE: 78 MMHG | TEMPERATURE: 98 F

## 2020-10-09 DIAGNOSIS — Z23 NEED FOR VACCINATION: ICD-10-CM

## 2020-10-09 DIAGNOSIS — M54.50 LUMBAR SPINE PAIN: Primary | ICD-10-CM

## 2020-10-09 DIAGNOSIS — N39.43 DRIBBLING URINE: ICD-10-CM

## 2020-10-09 PROBLEM — R39.198 DECREASED URINE STREAM: Status: RESOLVED | Noted: 2020-08-19 | Resolved: 2020-10-09

## 2020-10-09 PROCEDURE — 3078F DIAST BP <80 MM HG: CPT | Performed by: FAMILY MEDICINE

## 2020-10-09 PROCEDURE — 90686 IIV4 VACC NO PRSV 0.5 ML IM: CPT | Performed by: FAMILY MEDICINE

## 2020-10-09 PROCEDURE — 3074F SYST BP LT 130 MM HG: CPT | Performed by: FAMILY MEDICINE

## 2020-10-09 PROCEDURE — 99213 OFFICE O/P EST LOW 20 MIN: CPT | Performed by: FAMILY MEDICINE

## 2020-10-09 PROCEDURE — 90471 IMMUNIZATION ADMIN: CPT | Performed by: FAMILY MEDICINE

## 2020-10-09 PROCEDURE — 81003 URINALYSIS AUTO W/O SCOPE: CPT | Performed by: FAMILY MEDICINE

## 2020-10-09 PROCEDURE — 3008F BODY MASS INDEX DOCD: CPT | Performed by: FAMILY MEDICINE

## 2020-10-09 RX ORDER — CYCLOBENZAPRINE HCL 5 MG
TABLET ORAL 3 TIMES DAILY PRN
Qty: 15 TABLET | Refills: 0 | Status: SHIPPED | OUTPATIENT
Start: 2020-10-09 | End: 2020-12-02

## 2020-10-09 NOTE — PROGRESS NOTES
HPI:   Cheryl Gallagher is a 41year old male who is here for complaints of back pain. Pain is located at right low back, low back, buttock. Pain is described as aching. Severity is moderate. The pain radiates to buttock b/l.  Pain was precipitated by w cause    • Mumps without mention of complication    • Other and unspecified superficial injury of finger without mention of infection    • Proctitis    • Sleep disturbance, unspecified    • Sprain of neck    • Unspecified hemorrhoids with other complicatio kg)   BMI 25.41 kg/m²    GENERAL: well developed, well nourished,in no apparent distress  SKIN: no rashes,no suspicious lesions  NECK: supple,no adenopathy,no bruits  LUNGS: clear to auscultation  CARDIO: RRR without murmur  GI: normoactive bs x4, no itffany tablet; Refill: 0  - URINALYSIS, AUTO, W/O SCOPE    2. Dribbling urine  Urinalysis is normal as well as PSA patient has no significant prostate enlargement from prior exam will recheck at his annual physical.  - URINALYSIS, AUTO, W/O SCOPE    3.  Need for v

## 2020-12-02 ENCOUNTER — TELEMEDICINE (OUTPATIENT)
Dept: FAMILY MEDICINE CLINIC | Facility: CLINIC | Age: 40
End: 2020-12-02
Payer: COMMERCIAL

## 2020-12-02 DIAGNOSIS — M25.611 DECREASED RANGE OF MOTION OF RIGHT SHOULDER: ICD-10-CM

## 2020-12-02 DIAGNOSIS — S49.91XA INJURY OF RIGHT SHOULDER, INITIAL ENCOUNTER: Primary | ICD-10-CM

## 2020-12-02 DIAGNOSIS — R29.898 RIGHT ARM WEAKNESS: ICD-10-CM

## 2020-12-02 PROCEDURE — 99213 OFFICE O/P EST LOW 20 MIN: CPT | Performed by: FAMILY MEDICINE

## 2020-12-02 NOTE — PROGRESS NOTES
Kenzie Herndon is a 41year old male. HPI:   Please note that the following visit was completed using two-way, real-time interactive audio and video communication.   This has been done in good yvette to provide continuity of care in the best interest o numbness, tingling or radiating pain. No neck pain. The discomfort is affecting his sleep at night he is unable to sleep on his right side and is constantly moving.   Current Outpatient Medications   Medication Sig Dispense Refill   • atorvastatin 10 MG O and denies heartburn  NEURO: denies headaches  Musculoskeletal: Right shoulder pain and decreased range of motion see above  EXAM:   No vitals taken due to tele-visit.   15 minutes time was spent reviewing patient's inquiry, patient's record including prior MRI results. Patient preferred doing the MRI versus physical therapy since he is already tried to do some exercises and just feels too weak. The patient indicates understanding of these issues and agrees to the plan.   The patient is asked to return  as n

## 2020-12-15 ENCOUNTER — PATIENT MESSAGE (OUTPATIENT)
Dept: FAMILY MEDICINE CLINIC | Facility: CLINIC | Age: 40
End: 2020-12-15

## 2020-12-15 DIAGNOSIS — S73.191A TEAR OF RIGHT ACETABULAR LABRUM, INITIAL ENCOUNTER: Primary | ICD-10-CM

## 2020-12-15 NOTE — PROGRESS NOTES
Refer patient to Dr. Sarah Ku through [facility] orthopedics. There is a posterior labral tear with tendinosis of the infra spinatus, subscapularis and biceps tendon. Forward referral to patient information sent to my chart about MRI.

## 2020-12-15 NOTE — TELEPHONE ENCOUNTER
From: Tri Camilo  To: Amelia Valdes PA-C  Sent: 12/15/2020 2:50 PM CST  Subject: Test Results Question    Vazquez Cassette - I will reach out to Dr. Jessy Dacosta. Did the MRI seem to suggest I might need surgery?  Thanks!!

## 2021-01-08 ENCOUNTER — OFFICE VISIT (OUTPATIENT)
Dept: ORTHOPEDICS CLINIC | Facility: CLINIC | Age: 41
End: 2021-01-08
Payer: COMMERCIAL

## 2021-01-08 DIAGNOSIS — M75.41 IMPINGEMENT SYNDROME OF RIGHT SHOULDER: ICD-10-CM

## 2021-01-08 DIAGNOSIS — S43.431A NONTRAUMATIC TYPE 1 SUPERIOR LABRAL ANTERIOR-TO-POSTERIOR (SLAP) TEAR OF RIGHT SHOULDER, INITIAL ENCOUNTER: Primary | ICD-10-CM

## 2021-01-08 PROCEDURE — 20610 DRAIN/INJ JOINT/BURSA W/O US: CPT | Performed by: PHYSICIAN ASSISTANT

## 2021-01-08 PROCEDURE — 99213 OFFICE O/P EST LOW 20 MIN: CPT | Performed by: PHYSICIAN ASSISTANT

## 2021-01-08 RX ORDER — TRIAMCINOLONE ACETONIDE 40 MG/ML
40 INJECTION, SUSPENSION INTRA-ARTICULAR; INTRAMUSCULAR ONCE
Status: COMPLETED | OUTPATIENT
Start: 2021-01-08 | End: 2021-01-08

## 2021-01-08 RX ADMIN — TRIAMCINOLONE ACETONIDE 40 MG: 40 INJECTION, SUSPENSION INTRA-ARTICULAR; INTRAMUSCULAR at 10:41:00

## 2021-01-08 NOTE — PROGRESS NOTES
[facility] New Patient Note    CC: Right shoulder pain    HPI: This 41year old male presents today with complaints of right shoulder pain.   Onset of symptoms started over the summer at which time he was doing a lot of yard work and including taking • Cholecalciferol (VITAMIN D3 GUMMIES OR) Take 2 tablets by mouth daily. • mesalamine (CANASA) 1000 MG Rectal Suppos Place 1 suppository (1,000 mg total) rectally nightly as needed.  30 suppository 6   • busPIRone HCl 10 MG Oral Tab Take 1 tablet (10 load-and-shift testing. He has pain with Langley's test.  No pain with speeds test.  He has good strength on resisted external rotation and empty can testing without pain. Sensation is present to light touch.         Imaging: MRI scan of the right shoulde aid was placed over the injection site and they tolerated the procedure well. Patient was instructed to follow up with any adverse reactions.           Elke Martinez PA-C  THE [facility] OF [facility]opedic Surgery

## 2021-03-02 DIAGNOSIS — E78.2 MIXED HYPERLIPIDEMIA: ICD-10-CM

## 2021-03-02 RX ORDER — ATORVASTATIN CALCIUM 10 MG/1
10 TABLET, FILM COATED ORAL NIGHTLY
Qty: 90 TABLET | Refills: 1 | Status: SHIPPED | OUTPATIENT
Start: 2021-03-02 | End: 2021-08-30

## 2021-06-09 ENCOUNTER — OFFICE VISIT (OUTPATIENT)
Dept: FAMILY MEDICINE CLINIC | Facility: CLINIC | Age: 41
End: 2021-06-09
Payer: COMMERCIAL

## 2021-06-09 VITALS
WEIGHT: 192 LBS | DIASTOLIC BLOOD PRESSURE: 68 MMHG | SYSTOLIC BLOOD PRESSURE: 98 MMHG | TEMPERATURE: 97 F | HEART RATE: 68 BPM | BODY MASS INDEX: 24.64 KG/M2 | HEIGHT: 74.21 IN

## 2021-06-09 DIAGNOSIS — K51.20 ULCERATIVE PROCTITIS WITHOUT COMPLICATION (HCC): ICD-10-CM

## 2021-06-09 DIAGNOSIS — E78.2 MIXED HYPERLIPIDEMIA: ICD-10-CM

## 2021-06-09 DIAGNOSIS — N39.43 DRIBBLING URINE: ICD-10-CM

## 2021-06-09 DIAGNOSIS — R39.13 INTERMITTENT URINARY STREAM: ICD-10-CM

## 2021-06-09 DIAGNOSIS — R39.11 URINARY HESITANCY: ICD-10-CM

## 2021-06-09 DIAGNOSIS — D22.9 MULTIPLE PIGMENTED NEVI: ICD-10-CM

## 2021-06-09 DIAGNOSIS — Z00.00 LABORATORY EXAMINATION ORDERED AS PART OF A ROUTINE GENERAL MEDICAL EXAMINATION: ICD-10-CM

## 2021-06-09 DIAGNOSIS — Z00.00 WELL ADULT EXAM: Primary | ICD-10-CM

## 2021-06-09 PROCEDURE — 3078F DIAST BP <80 MM HG: CPT | Performed by: FAMILY MEDICINE

## 2021-06-09 PROCEDURE — 99396 PREV VISIT EST AGE 40-64: CPT | Performed by: FAMILY MEDICINE

## 2021-06-09 PROCEDURE — 3008F BODY MASS INDEX DOCD: CPT | Performed by: FAMILY MEDICINE

## 2021-06-09 PROCEDURE — 3074F SYST BP LT 130 MM HG: CPT | Performed by: FAMILY MEDICINE

## 2021-06-09 NOTE — PROGRESS NOTES
Rosamaria Hines is a 35year old male who presents for a complete physical exam.   HPI:   Patient is here for complete physical past year . 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 Looking forward to going to work. kg)    Body mass index is 24.51 kg/m².      Results for orders placed or performed in visit on 10/09/20   URINALYSIS, AUTO, W/O SCOPE   Result Value Ref Range    Glucose Urine Negative mg/dL    Bilirubin Urine Negative Negative    Ketones, UA Negative Negat • Lipids Father    • Hypertension Mother    • No Known Problems Sister    • Cancer Maternal Grandfather         Liver   • Diabetes Maternal Grandfather    • Heart Disorder Paternal Grandfather         unclear on diagnosis   • No Known Problems Son    • N Wt 192 lb (87.1 kg)   BMI 24.51 kg/m²   Body mass index is 24.51 kg/m².    GENERAL: NAD, pleasant, well developed, normal voice  SKIN: no rashes, no suspicious moles or lesions  HENT: NCAT, EACs clear b/l, TMs normal b/l, nasal turbinatess normal b/l, oroph 10/06/2016      Influenza             10/03/2019      TDAP                  10/06/2016      ASSESSMENT AND PLAN:   Issa Guillory is a 35year old male who presents for a complete physical exam.   Well adult exam  (primary encoun Future  - LIPID PANEL; Future  - CBC WITH DIFFERENTIAL WITH PLATELET; Future  - TSH+FREE T4; Future  - PSA SCREEN; Future   The patient verbalizes understanding of these recommendations and agrees to the plan.   The patient is asked to return for CPX in 1 y

## 2021-06-29 ENCOUNTER — PATIENT MESSAGE (OUTPATIENT)
Dept: FAMILY MEDICINE CLINIC | Facility: CLINIC | Age: 41
End: 2021-06-29

## 2021-06-29 NOTE — TELEPHONE ENCOUNTER
From: Susan Dunne  To: Leola King PA-C  Sent: 6/29/2021 9:22 AM CDT  Subject: Referral Request    Lacie Scheuermann - my cousin was hurt in auto accident as a passenger. He does not have health insurance.  He needs to see a doctor for purposing of thelma

## 2021-07-23 ENCOUNTER — OFFICE VISIT (OUTPATIENT)
Dept: SURGERY | Facility: CLINIC | Age: 41
End: 2021-07-23
Payer: COMMERCIAL

## 2021-07-23 DIAGNOSIS — R39.198 SLOW URINARY STREAM: ICD-10-CM

## 2021-07-23 DIAGNOSIS — R33.9 INCOMPLETE BLADDER EMPTYING: ICD-10-CM

## 2021-07-23 DIAGNOSIS — R82.90 URINE FINDING: Primary | ICD-10-CM

## 2021-07-23 LAB
APPEARANCE: CLEAR
BILIRUBIN: NEGATIVE
GLUCOSE (URINE DIPSTICK): NEGATIVE MG/DL
KETONES (URINE DIPSTICK): NEGATIVE MG/DL
LEUKOCYTES: NEGATIVE
MULTISTIX LOT#: NORMAL NUMERIC
NITRITE, URINE: NEGATIVE
OCCULT BLOOD: NEGATIVE
PH, URINE: 7.5 (ref 4.5–8)
PROTEIN (URINE DIPSTICK): NEGATIVE MG/DL
SPECIFIC GRAVITY: 1.01 (ref 1–1.03)
UROBILINOGEN,SEMI-QN: 0.2 MG/DL (ref 0–1.9)

## 2021-07-23 PROCEDURE — 99243 OFF/OP CNSLTJ NEW/EST LOW 30: CPT | Performed by: UROLOGY

## 2021-07-23 PROCEDURE — 81003 URINALYSIS AUTO W/O SCOPE: CPT | Performed by: UROLOGY

## 2021-07-23 NOTE — PROGRESS NOTES
Rooming Clinician:     Halinajemma Gordillo is a 35year old male.   Patient presents with:  Consult: trouble starting urination  BPH  Stream: strong   Daytime frequency: normal  Straining to urinate: No    Empty after urination: Yes but takes long to empty Surgical History:   Procedure Laterality Date   • BACK SURGERY     • COLONOSCOPY  1/10/11   • COLONOSCOPY  2/2010   • COLONOSCOPY  2009    FFS   • EGD  1/10/11   • VASECTOMY  5/2/2012      Social History:  Social History    Tobacco Use      Smoking status: 4.0 09/02/2020    ALKPHO 66 09/02/2020    AST 17 09/02/2020    ALT 34 09/02/2020       X-RAY[de-identified]         ASSESSMENT:     Incomplete bladder emptying  Weak stream  Double voiding    PLAN:     Renal ultrasound  Flexible cystoscopy in the office in 1 to 2 weeks

## 2021-07-23 NOTE — PATIENT INSTRUCTIONS
On behalf of myself and care team, I would like to thank you for entrusting us with your care today. It is our goal to provide you and your family with excellent care.   Please note that you may receive a survey in the mail; any feedback you have for this minutes. It takes longer if a biopsy, X-ray, or treatment needs to be done. During the procedure:  · You lie on an exam table on your back, knees bent and legs apart. You are covered with a drape. · Your urethra and the area around it are washed.  Anesth

## 2021-08-05 ENCOUNTER — TELEPHONE (OUTPATIENT)
Dept: SURGERY | Facility: CLINIC | Age: 41
End: 2021-08-05

## 2021-08-05 NOTE — TELEPHONE ENCOUNTER
Patient is scheduled for in office cystoscopy CPT 27636 on . Per [facility] PPO/Availity - no pa required. Date of Service Aug 05, 2021Transaction ID: [id number] Transaction Date: Aug 05 10:07 am Customer ID: Kesha Richards Began  Member ID [id number]   May 22, 1980  Gender Male  Plan / Coverage Date 2020 - Dec 31, 9999    View Member ID Card Patient  Patient Care Summary  Patient Information  Coverage and Benefits  Pre-Authorization Info    Feedback  Requested Procedure Code Authorization  Procedure Code Auth Required?  Notes  18884 - Cystoscopy  In Network   No Auth Required  Office  Procedure codes are supported for preauthorization requirement only and are not used for benefit determination

## 2021-08-13 ENCOUNTER — TELEPHONE (OUTPATIENT)
Dept: SURGERY | Facility: CLINIC | Age: 41
End: 2021-08-13

## 2021-08-16 NOTE — TELEPHONE ENCOUNTER
This RN called patient in response to his call:     \"Per pt calling to schedule a cysto, please advise'     No answer. Mailbox is full. Unable to accept messages. RN to offer 9/2 8:30AM for cystoscopy at the office.

## 2021-08-16 NOTE — TELEPHONE ENCOUNTER
RN called patient in response to his call:     \"Per pt cysto appt for 9/2 at 8:30am thank you\"    RN also explained the cystoscopy procedure and all questions answered. He is agreeable to plans and verbalized understanding.      Note, he was last seen at

## 2021-08-30 DIAGNOSIS — E78.2 MIXED HYPERLIPIDEMIA: ICD-10-CM

## 2021-08-30 RX ORDER — ATORVASTATIN CALCIUM 10 MG/1
TABLET, FILM COATED ORAL
Qty: 90 TABLET | Refills: 3 | Status: SHIPPED | OUTPATIENT
Start: 2021-08-30

## 2021-08-30 NOTE — TELEPHONE ENCOUNTER
Pt requesting refill of atorvastatin  Passed protocol, refill approved, sent to pharmacy:   Last Office Visit with Provider: 6/9/21    No future appointments.

## 2021-09-01 ENCOUNTER — HOSPITAL ENCOUNTER (OUTPATIENT)
Dept: ULTRASOUND IMAGING | Age: 41
Discharge: HOME | End: 2021-09-01
Attending: UROLOGY
Payer: COMMERCIAL

## 2021-09-01 DIAGNOSIS — R33.9 INCOMPLETE BLADDER EMPTYING: ICD-10-CM

## 2021-09-01 DIAGNOSIS — R39.198 SLOW URINARY STREAM: ICD-10-CM

## 2021-09-01 PROCEDURE — 76775 US EXAM ABDO BACK WALL LIM: CPT | Performed by: UROLOGY

## 2021-09-02 ENCOUNTER — PROCEDURE (OUTPATIENT)
Dept: SURGERY | Facility: CLINIC | Age: 41
End: 2021-09-02
Payer: COMMERCIAL

## 2021-09-02 VITALS — SYSTOLIC BLOOD PRESSURE: 131 MMHG | DIASTOLIC BLOOD PRESSURE: 84 MMHG | HEART RATE: 73 BPM

## 2021-09-02 DIAGNOSIS — R39.13 INTERMITTENT URINARY STREAM: ICD-10-CM

## 2021-09-02 DIAGNOSIS — R82.90 URINE FINDING: Primary | ICD-10-CM

## 2021-09-02 LAB
APPEARANCE: CLEAR
BILIRUBIN: NEGATIVE
GLUCOSE (URINE DIPSTICK): NEGATIVE MG/DL
KETONES (URINE DIPSTICK): NEGATIVE MG/DL
LEUKOCYTES: NEGATIVE
MULTISTIX LOT#: NORMAL NUMERIC
NITRITE, URINE: NEGATIVE
OCCULT BLOOD: NEGATIVE
PH, URINE: 6 (ref 4.5–8)
PROTEIN (URINE DIPSTICK): NEGATIVE MG/DL
SPECIFIC GRAVITY: 1.02 (ref 1–1.03)
URINE-COLOR: YELLOW
UROBILINOGEN,SEMI-QN: 0.2 MG/DL (ref 0–1.9)

## 2021-09-02 PROCEDURE — 3079F DIAST BP 80-89 MM HG: CPT | Performed by: UROLOGY

## 2021-09-02 PROCEDURE — 76856 US EXAM PELVIC COMPLETE: CPT | Performed by: UROLOGY

## 2021-09-02 PROCEDURE — 81003 URINALYSIS AUTO W/O SCOPE: CPT | Performed by: UROLOGY

## 2021-09-02 PROCEDURE — 3075F SYST BP GE 130 - 139MM HG: CPT | Performed by: UROLOGY

## 2021-09-02 PROCEDURE — 52000 CYSTOURETHROSCOPY: CPT | Performed by: UROLOGY

## 2021-09-02 RX ORDER — CIPROFLOXACIN 500 MG/1
500 TABLET, FILM COATED ORAL ONCE
Status: COMPLETED | OUTPATIENT
Start: 2021-09-02 | End: 2021-09-02

## 2021-09-02 RX ORDER — ALFUZOSIN HYDROCHLORIDE 10 MG/1
10 TABLET, EXTENDED RELEASE ORAL DAILY
Qty: 30 TABLET | Refills: 3 | Status: SHIPPED | OUTPATIENT
Start: 2021-09-02 | End: 2021-11-17

## 2021-09-02 RX ADMIN — CIPROFLOXACIN 500 MG: 500 TABLET, FILM COATED ORAL at 09:19:00

## 2021-09-02 NOTE — PROGRESS NOTES
Rooming Clinician:     Andres Bueno is a 35year old male. Patient presents with:  Procedure: cystoscopy        HPI:     Patient returns for cystoscopy. He has a history of double and triple voiding for a long period of time.   Stream is very stron Smoking status: Never Smoker      Smokeless tobacco: Former User        Types: Chew    Vaping Use      Vaping Use: Never used    Alcohol use:  Yes      Alcohol/week: 4.0 - 7.0 standard drinks      Types: 4 - 7 Standard drinks or equivalent per week      Com well. He was given a postoperative prophylactic dose of antibiotic and the usual postoperative instructions.         The bladder was scanned after cystoscopy and estimating that less than 100 cc was instilled for cystoscopy with a small amount of urine left emptying    PLAN:     I reviewed the findings of my examination. There is no obstructive component the patient's bladder outlet symptoms. I suspect he may have some neurogenic component and some sphincteric component of his double voiding.   I offered the

## 2021-09-02 NOTE — PATIENT INSTRUCTIONS
On behalf of myself and care team, I would like to thank you for entrusting us with your care today. It is our goal to provide you and your family with excellent care.   Please note that you may receive a survey in the mail; any feedback you have for this cause dizziness or fainting, especially after exercising or in hot weather. Be very careful when standing or sitting up quickly. Your ability to stay alert or to react quickly may be impaired by this medicine.  Do not drive or operate machinery until you k quickly. Extra  Please speak with your doctor, nurse, or pharmacist if you have any questions about this medicine. [email or web address]  IMPORTANT NOTE: This document tells you briefly how to take your medicine, but it does not

## 2021-11-17 ENCOUNTER — OFFICE VISIT (OUTPATIENT)
Dept: FAMILY MEDICINE CLINIC | Facility: CLINIC | Age: 41
End: 2021-11-17
Payer: COMMERCIAL

## 2021-11-17 VITALS
BODY MASS INDEX: 24.51 KG/M2 | WEIGHT: 191 LBS | SYSTOLIC BLOOD PRESSURE: 110 MMHG | TEMPERATURE: 98 F | HEART RATE: 72 BPM | HEIGHT: 74.21 IN | DIASTOLIC BLOOD PRESSURE: 70 MMHG

## 2021-11-17 DIAGNOSIS — R06.81 WITNESSED EPISODE OF APNEA: ICD-10-CM

## 2021-11-17 DIAGNOSIS — M54.2 NECK PAIN, CHRONIC: Primary | ICD-10-CM

## 2021-11-17 DIAGNOSIS — R53.82 CHRONIC FATIGUE: ICD-10-CM

## 2021-11-17 DIAGNOSIS — G89.29 NECK PAIN, CHRONIC: Primary | ICD-10-CM

## 2021-11-17 DIAGNOSIS — F41.1 GAD (GENERALIZED ANXIETY DISORDER): ICD-10-CM

## 2021-11-17 DIAGNOSIS — K51.20 ULCERATIVE PROCTITIS WITHOUT COMPLICATION (HCC): ICD-10-CM

## 2021-11-17 PROCEDURE — 99214 OFFICE O/P EST MOD 30 MIN: CPT | Performed by: FAMILY MEDICINE

## 2021-11-17 PROCEDURE — 3074F SYST BP LT 130 MM HG: CPT | Performed by: FAMILY MEDICINE

## 2021-11-17 PROCEDURE — 3078F DIAST BP <80 MM HG: CPT | Performed by: FAMILY MEDICINE

## 2021-11-17 PROCEDURE — 3008F BODY MASS INDEX DOCD: CPT | Performed by: FAMILY MEDICINE

## 2021-11-17 NOTE — PROGRESS NOTES
Andreia Dias is a 35year old male. HPI:   1.  Neck pain, chronic  Patient states that years ago he had gotten injections 10 years ago in [facility] in his neck states that the pain has been worse recently with prolonged time spent doing his job someti Trouble falling or staying asleep, or sleeping too much: Several days  4. Feeling tired or having little energy: Several days  5. Poor appetite or overeating: Not at all  6.  Feeling bad about yourself - or that you are a failure or have let yourself or you Cholecalciferol (VITAMIN D3 GUMMIES OR) Take 2 tablets by mouth daily. • mesalamine 1000 MG Rectal Suppos Place 1 suppository (1,000 mg total) rectally nightly as needed.  30 suppository 6      Past Medical History:   Diagnosis Date   • Acute sinusitis, sclera clear without injection  NECK: supple,no adenopathy, thyroid normal to palpation  LUNGS: clear to auscultation no rales, rhonchi or wheezes  CARDIO: RRR without murmur  GI: Normal bowel sounds ×4 quadrant, no hepatosplenomegaly or masses, and no ten SLEEP APNEA TEST  - GENERAL SLEEP STUDY TRANSCRIPTION; Future    Time spent was 30 minutes more than 50% was spent on counseling regarding medical conditions and treatment.   Rest of time was spent reviewing chart, reviewing blood work and charting    The p

## 2021-12-01 ENCOUNTER — HOSPITAL ENCOUNTER (OUTPATIENT)
Dept: GENERAL RADIOLOGY | Age: 41
Discharge: HOME | End: 2021-12-01
Attending: FAMILY MEDICINE
Payer: COMMERCIAL

## 2021-12-01 ENCOUNTER — LAB ENCOUNTER (OUTPATIENT)
Dept: LAB | Facility: REFERENCE LAB | Age: 41
End: 2021-12-01
Attending: FAMILY MEDICINE
Payer: COMMERCIAL

## 2021-12-01 DIAGNOSIS — M54.2 NECK PAIN, CHRONIC: ICD-10-CM

## 2021-12-01 DIAGNOSIS — G89.29 NECK PAIN, CHRONIC: ICD-10-CM

## 2021-12-01 DIAGNOSIS — Z00.00 LABORATORY EXAMINATION ORDERED AS PART OF A ROUTINE GENERAL MEDICAL EXAMINATION: ICD-10-CM

## 2021-12-01 PROCEDURE — 80061 LIPID PANEL: CPT

## 2021-12-01 PROCEDURE — 36415 COLL VENOUS BLD VENIPUNCTURE: CPT

## 2021-12-01 PROCEDURE — 80053 COMPREHEN METABOLIC PANEL: CPT

## 2021-12-01 PROCEDURE — 84439 ASSAY OF FREE THYROXINE: CPT

## 2021-12-01 PROCEDURE — 72040 X-RAY EXAM NECK SPINE 2-3 VW: CPT | Performed by: FAMILY MEDICINE

## 2021-12-01 PROCEDURE — 84443 ASSAY THYROID STIM HORMONE: CPT

## 2021-12-01 PROCEDURE — 85025 COMPLETE CBC W/AUTO DIFF WBC: CPT

## 2021-12-02 NOTE — PROGRESS NOTES
Cholesterol and LDL looks much better and triglycerides are coming down. Take an omega-3 supplements of at least 1,000 mg of EPA plus DHA. Also consider Arthur seeds daily to help bring triglycerides down.   Complete blood count, prostate blood test and thy

## 2021-12-02 NOTE — PROGRESS NOTES
Arthritis present C4-C6 with a mild space narrowing between the vertebrae C5-C6 and C6-C7. Can try physical therapy if pain is a persistent issue.

## 2022-04-13 ENCOUNTER — OFFICE VISIT (OUTPATIENT)
Dept: FAMILY MEDICINE CLINIC | Facility: CLINIC | Age: 42
End: 2022-04-13
Payer: COMMERCIAL

## 2022-04-13 VITALS
DIASTOLIC BLOOD PRESSURE: 70 MMHG | HEIGHT: 74 IN | OXYGEN SATURATION: 99 % | WEIGHT: 187 LBS | TEMPERATURE: 98 F | SYSTOLIC BLOOD PRESSURE: 122 MMHG | BODY MASS INDEX: 24 KG/M2 | RESPIRATION RATE: 18 BRPM | HEART RATE: 63 BPM

## 2022-04-13 DIAGNOSIS — T14.8XXA HEMATOMA AND CONTUSION: Primary | ICD-10-CM

## 2022-04-13 DIAGNOSIS — M54.2 CHRONIC CERVICAL PAIN: ICD-10-CM

## 2022-04-13 DIAGNOSIS — G89.29 CHRONIC CERVICAL PAIN: ICD-10-CM

## 2022-04-13 DIAGNOSIS — M50.30 DDD (DEGENERATIVE DISC DISEASE), CERVICAL: ICD-10-CM

## 2022-04-13 PROCEDURE — 99213 OFFICE O/P EST LOW 20 MIN: CPT | Performed by: FAMILY MEDICINE

## 2022-04-13 PROCEDURE — 3008F BODY MASS INDEX DOCD: CPT | Performed by: FAMILY MEDICINE

## 2022-04-13 PROCEDURE — 3078F DIAST BP <80 MM HG: CPT | Performed by: FAMILY MEDICINE

## 2022-04-13 PROCEDURE — 3074F SYST BP LT 130 MM HG: CPT | Performed by: FAMILY MEDICINE

## 2022-05-04 ENCOUNTER — PATIENT MESSAGE (OUTPATIENT)
Dept: FAMILY MEDICINE CLINIC | Facility: CLINIC | Age: 42
End: 2022-05-04

## 2022-09-18 DIAGNOSIS — E78.2 MIXED HYPERLIPIDEMIA: ICD-10-CM

## 2022-09-20 RX ORDER — ATORVASTATIN CALCIUM 10 MG/1
TABLET, FILM COATED ORAL
Qty: 90 TABLET | Refills: 0 | Status: SHIPPED | OUTPATIENT
Start: 2022-09-20

## (undated) NOTE — LETTER
07/14/20        Lise Bryant  [address] [facility] [id number]      Dear Vangie Haro,    [address] records indicate that you have outstanding lab work and or testing that was ordered for you and has not yet been completed:  Please fast for 10 hours p

## (undated) NOTE — LETTER
07/03/19        Meet Duvall  [address] [place] [id number]      Dear Mu Mane,    [address] records indicate that you have outstanding lab work and or testing that was ordered for you and has not yet been completed:        lipid  Please fast f